# Patient Record
Sex: FEMALE | Race: WHITE | Employment: STUDENT | URBAN - METROPOLITAN AREA
[De-identification: names, ages, dates, MRNs, and addresses within clinical notes are randomized per-mention and may not be internally consistent; named-entity substitution may affect disease eponyms.]

---
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
## 2018-04-11 PROBLEM — Z00.00 ANNUAL PHYSICAL EXAM: Status: RESOLVED | Noted: 2017-12-18 | Resolved: 2018-04-11

## 2019-01-13 PROBLEM — Z00.00 ANNUAL PHYSICAL EXAM: Status: RESOLVED | Noted: 2017-12-18 | Resolved: 2019-01-13

## 2019-05-07 PROBLEM — M25.562 ACUTE PAIN OF LEFT KNEE: Status: ACTIVE | Noted: 2019-05-07

## 2021-04-29 ENCOUNTER — HOSPITAL ENCOUNTER (OUTPATIENT)
Dept: MRI IMAGING | Age: 21
Discharge: HOME | End: 2021-05-01
Payer: COMMERCIAL

## 2021-04-29 DIAGNOSIS — H47.291 OTHER OPTIC ATROPHY, RIGHT EYE: ICD-10-CM

## 2021-04-29 DIAGNOSIS — H47.031: ICD-10-CM

## 2021-04-29 LAB
BUN BLDV-MCNC: 16 MG/DL (ref 6–20)
CREAT SERPL-MCNC: 0.72 MG/DL (ref 0.5–0.9)
GFR AFRICAN AMERICAN: >60 ML/MIN
GFR NON-AFRICAN AMERICAN: >60 ML/MIN
GFR SERPL CREATININE-BSD FRML MDRD: NORMAL ML/MIN/{1.73_M2}
GFR SERPL CREATININE-BSD FRML MDRD: NORMAL ML/MIN/{1.73_M2}

## 2021-04-29 PROCEDURE — 84520 ASSAY OF UREA NITROGEN: CPT

## 2021-04-29 PROCEDURE — A9579 GAD-BASE MR CONTRAST NOS,1ML: HCPCS | Performed by: OPHTHALMOLOGY

## 2021-04-29 PROCEDURE — 6360000004 HC RX CONTRAST MEDICATION: Performed by: OPHTHALMOLOGY

## 2021-04-29 PROCEDURE — 2580000003 HC RX 258: Performed by: OPHTHALMOLOGY

## 2021-04-29 PROCEDURE — 82565 ASSAY OF CREATININE: CPT

## 2021-04-29 PROCEDURE — 70553 MRI BRAIN STEM W/O & W/DYE: CPT

## 2021-04-29 PROCEDURE — 36415 COLL VENOUS BLD VENIPUNCTURE: CPT

## 2021-04-29 RX ORDER — SODIUM CHLORIDE 0.9 % (FLUSH) 0.9 %
10 SYRINGE (ML) INJECTION PRN
Status: DISCONTINUED | OUTPATIENT
Start: 2021-04-29 | End: 2021-05-02 | Stop reason: HOSPADM

## 2021-04-29 RX ADMIN — Medication 10 ML: at 08:23

## 2021-04-29 RX ADMIN — GADOTERIDOL 10 ML: 279.3 INJECTION, SOLUTION INTRAVENOUS at 08:23

## 2024-08-12 ENCOUNTER — HOSPITAL ENCOUNTER (OUTPATIENT)
Dept: GENERAL RADIOLOGY | Age: 24
Discharge: HOME | End: 2024-08-14
Attending: PREVENTIVE MEDICINE
Payer: COMMERCIAL

## 2024-08-12 ENCOUNTER — HOSPITAL ENCOUNTER (OUTPATIENT)
Age: 24
Discharge: HOME | End: 2024-08-12

## 2024-08-12 DIAGNOSIS — T14.90XA INJURY: ICD-10-CM

## 2024-08-12 PROCEDURE — 73610 X-RAY EXAM OF ANKLE: CPT

## 2024-08-12 PROCEDURE — 73630 X-RAY EXAM OF FOOT: CPT

## 2024-08-29 ENCOUNTER — HOSPITAL ENCOUNTER (OUTPATIENT)
Dept: PHYSICAL THERAPY | Age: 24
Setting detail: THERAPIES SERIES
Discharge: HOME | End: 2024-08-29
Payer: COMMERCIAL

## 2024-08-29 PROCEDURE — 97110 THERAPEUTIC EXERCISES: CPT

## 2024-08-29 PROCEDURE — 97161 PT EVAL LOW COMPLEX 20 MIN: CPT

## 2024-08-29 PROCEDURE — 97016 VASOPNEUMATIC DEVICE THERAPY: CPT

## 2024-08-29 NOTE — THERAPY EVALUATION
[x] [facility]   Outpatient Rehabilitation & Therapy  [address]  P: [phone]   F: [phone]     Physical therapy LE evaluation     Date:  2024  Patient: Charo Mobley  : 2000  MRN: [id number]  Physician:  Nestor Rivera MD (SEND ALL NOTES)   Insurance:  - [employer]-- presumptive auth 2-3 weeks for 12 visits   Medical Diagnosis:   S93.601A (ICD-10-CM) - Unspecified sprain of right foot, initial encounter   S93.401A (ICD-10-CM) - Sprain of unspecified ligament of right ankle, initial encounter   Rehab Codes: R25 pain , M25.60 stiffness, R53.1 weakness   Onset Date: 24   Next 's appt: 24      PRECAUTIONS: None    Subjective:     Patient presents ambulating with B axillary crutches with ACE wrap and surgical shoe on R ankle and foot. She reports she was hurt at work, Aug 12 and has not been working since. She reports that she took a weird step on an incline and her R foot went into a hole and she inverted her ankle. Upon injury she didn't note any noises but felt pain and a lot of stretching to the lateral ankle immediately. She noticed edema immediately upon injury, bruising was delayed onset by 1-2 days on lateral foot and dorsum. She notes that she ices and elevates the affected ankle to relieve symptoms. She notes also that she wakes up multiple times per night due to her leg shifting out of a comfortable position, but she is able to return to sleep shortly after. She reports that she has difficulty standing for more than 5-10 minutes, is no longer driving or working. She is capable of ambulating and ascending/descending stairs with the crutches when needed. She will follow up with occupational health on 24. No weight bearing restrictions have been assigned. She feels that since the onset of the injury pain and edema are improving. Patient's goals for therapy are to return to full function and work.       PMHx: [] Unremarkable []  difficulty with bearing more than 20% of weight on R side. Due to her deficits she is unable to walk which is necessary for her work duties. Feel that with skilled PT patient will benefit in order to improve pain, rom, strength, and ability to weightbear to restore normal function.         Problems:    [x] ? Pain:  [x] ? ROM:  [x] ? Strength:  [x] ? Function:  [] Other:    STG: (to be met in 6 treatments)  Pt will self report worst pain no greater than 4/10 in R ankle in order to better tolerate ADLs/work activities with minimal dysfunction  Pt will improve AROM in R ankle DF to neutral in order to demonstrate ability to move/reach in all planes unrestricted at PLOF  Pt will improve AROM in R ankle iv/ev to at least 20 degrees in order to demonstrate ability to move/reach in all planes unrestricted at PLOF  Pt will have no palpable signs of edema in R ankle in order to demonstrate healing and return to function.  Pt will be able to stand with equal weightbearing between legs in order to progress with normalized function.  LTG: (to be met in 12 treatments)  Pt will demonstrate improved R ankle strength to 5/5 with no pain in order to demonstrate improved stability/strength necessary for unrestricted ADLs/work activities  Pt will be able to complete a full squat with no ankle pain to be able to pick things up at work.  Pt will be able to walk with no deviations in order to carry out work responsibilities.  Pt will demonstrate >10 R SLS stability with out UE support to show R ankle control needed to navigate uneven surfaces and perform higher level mobility.  Pt will increase score on FAAM to > 54/84 in order to demonstrate improved functional tolerances at PLOF with minimal restriction/dysfunction  Pt will demonstrate independence with a long term HEP for continued progress/maintenance after completion of PT                   Functional Assessment Used: FAAM   Current Status Score: 34/84   Goal Status Score: 54/84

## 2024-09-03 ENCOUNTER — HOSPITAL ENCOUNTER (OUTPATIENT)
Dept: PHYSICAL THERAPY | Age: 24
Setting detail: THERAPIES SERIES
Discharge: HOME | End: 2024-09-03
Payer: COMMERCIAL

## 2024-09-03 PROCEDURE — 97110 THERAPEUTIC EXERCISES: CPT

## 2024-09-03 PROCEDURE — 97016 VASOPNEUMATIC DEVICE THERAPY: CPT

## 2024-09-03 NOTE — FLOWSHEET NOTE
[facility]   Outpatient Rehabilitation & Therapy  [address]  P: [phone]   F: [phone]    Physical Therapy Daily Treatment Note      Date:  9/3/2024  Patient Name:  Charo Mobley    :  2000  MRN: [id number]  Physician:      Nestor Rivera MD (SEND ALL NOTES)                            Insurance:  - [employer]-- presumptive auth 2-3 weeks for 12 visits   Medical Diagnosis:   S93.601A (ICD-10-CM) - Unspecified sprain of right foot, initial encounter   S93.401A (ICD-10-CM) - Sprain of unspecified ligament of right ankle, initial encounter   Rehab Codes: R25 pain , M25.60 stiffness, R53.1 weakness   Onset Date: 24               Next 's appt: 24  Visit# / total visits: 2  Cancels/No Shows: 0/0    Subjective:  Reports today with minimal pain. Reports compliance with HEP. States she gets a tingling sensation in lateral plantar aspect of her right foot with any weightbearing through the R LE. She is still using crutches at home as she is afraid to put a lot of weight on R foot due to extreme pain she had the first week after injury.  Pain:  [] Yes  [] No Location: Right ankle Pain Rating: (0-10 scale) 2-3/10  Pain altered Tx:  [] No  [] Yes  Action:  Comments:    Objective:  Precautions: NONE   INTERVENTIONS  Reps/ Time Weight/ Level Completed  Today Comments               MODALITIES            Vasocompression - R ankle  10 min  Low, 34 deg  x                 MANUAL                                EXERCISES            PROM - R ankle   5 min   x Supine PF, Df,Inv/ER     AROM -R ankle  2x10    x   supine DF/PF     Right gastroc/soleus  stretch with strap 3x30\"  each     x   ling sitting HEP review     Arch lifts   2x10   X   Seated    Toe Yoga  2x10  X  Seated   Seated PF  2x10  X  Seated    Towel scrunches  2x10  X  Seated    Inv/ER windshield wiper on towel 2x10  x Seated    Standing R LE weight shifts M/L , forward with step 2x10 each   X

## 2024-09-05 ENCOUNTER — HOSPITAL ENCOUNTER (OUTPATIENT)
Dept: PHYSICAL THERAPY | Age: 24
Setting detail: THERAPIES SERIES
Discharge: HOME | End: 2024-09-05
Payer: COMMERCIAL

## 2024-09-05 PROCEDURE — 97116 GAIT TRAINING THERAPY: CPT

## 2024-09-05 PROCEDURE — 97016 VASOPNEUMATIC DEVICE THERAPY: CPT

## 2024-09-05 PROCEDURE — 97110 THERAPEUTIC EXERCISES: CPT

## 2024-09-05 NOTE — FLOWSHEET NOTE
[facility]   Outpatient Rehabilitation & Therapy  [address]  P: [phone]   F: [phone]    Physical Therapy Daily Treatment Note      Date:  2024  Patient Name:  Charo Mobley    :  2000  MRN: [id number]  Physician:      Nestor Rivera MD (SEND ALL NOTES)                            Insurance:  - [employer]-- presumptive auth 2-3 weeks for 12 visits   Medical Diagnosis:   S93.601A (ICD-10-CM) - Unspecified sprain of right foot, initial encounter   S93.401A (ICD-10-CM) - Sprain of unspecified ligament of right ankle, initial encounter   Rehab Codes: R25 pain , M25.60 stiffness, R53.1 weakness   Onset Date: 24               Next 's appt: 24  Visit# / total visits: 3  Cancels/No Shows: 0/0    Subjective:  Reports today with minimal pain. Patient informed by physician to decrease to single crutch.  Presented to therapy with ANTONI crutches and NWBing.   Pain:  [] Yes  [] No Location: Right ankle Pain Rating: (0-10 scale) 2/10  Pain altered Tx:  [] No  [] Yes  Action:  Comments:    Objective:  Precautions: NONE   INTERVENTIONS  Reps/ Time Weight/ Level Completed  Today Comments               MODALITIES            Vasocompression - R ankle  10 min  Low, 34 deg  x                 MANUAL                                EXERCISES            PROM - R ankle   5 min   x Supine PF, Df,Inv/ER    AROM -R ankle  2x10   X  supine DF/PF    AROM- R ankle 2x10  x  Supine INV/EV    Right gastroc/soleus  stretch with strap 3x30\"  each    X   ling sitting HEP review     Arch lifts   2x10   X   Seated    Toe Yoga  2x10  X  Seated   Seated PF  2x10  X  Seated    Towel scrunches  2x10  X  Seated    Inv/ER windshield wiper on towel 2x10  x Seated    Standing R LE weight shifts M/L , forward with step 2x10 each   X     Alternating March 2x10   X    Gait Training   X Single crutch step thru pattern      Patient Education/Home program:   : Access Code: [email or web address] //

## 2024-09-06 ENCOUNTER — HOSPITAL ENCOUNTER (OUTPATIENT)
Dept: PHYSICAL THERAPY | Age: 24
Setting detail: THERAPIES SERIES
Discharge: HOME | End: 2024-09-06
Payer: COMMERCIAL

## 2024-09-06 PROCEDURE — 97112 NEUROMUSCULAR REEDUCATION: CPT

## 2024-09-06 PROCEDURE — 97016 VASOPNEUMATIC DEVICE THERAPY: CPT

## 2024-09-06 PROCEDURE — 97110 THERAPEUTIC EXERCISES: CPT

## 2024-09-06 NOTE — FLOWSHEET NOTE
functional range. Patient education on respecting a 1-2 point increase in pain was given. Seated use of the mobo board and foot exercises were provided to strengthen the intrinsic foot musculature and promote stability. Standing exercises were given to provide loading weight acceptance and facilitate motor control. Patient reported improvement of weight bearing tolerance as compared to previous sessions. Patient was educated that she can be doing these weight shifts at home to further increase weight bearing tolerance. Vasocompression was administered to the R ankle for 10 minutes to control edema and pain following session. Patient reported an increase in pain to 3/10 following session however reported that after vasocompression it returned to 2/10. Feel that patient should continue with strengthening exercises in sitting and be progressed with standing as tolerated.    [] No change.     [] Other:   [x] Patient would continue to benefit from skilled physical therapy services in order to address the above limitations to improve functional mobility and decrease pain for patient to return to work with least amount of compensation or restriction.     STG: (to be met in 6 treatments)  Pt will self report worst pain no greater than 4/10 in R ankle in order to better tolerate ADLs/work activities with minimal dysfunction  Pt will improve AROM in R ankle DF to neutral in order to demonstrate ability to move/reach in all planes unrestricted at PLOF  Pt will improve AROM in R ankle iv/ev to at least 20 degrees in order to demonstrate ability to move/reach in all planes unrestricted at PLOF  Pt will have no palpable signs of edema in R ankle in order to demonstrate healing and return to function.  Pt will be able to stand with equal weightbearing between legs in order to progress with normalized function.  LTG: (to be met in 12 treatments)  Pt will demonstrate improved R ankle strength to 5/5 with no pain in order to

## 2024-09-09 ENCOUNTER — HOSPITAL ENCOUNTER (OUTPATIENT)
Dept: PHYSICAL THERAPY | Age: 24
Setting detail: THERAPIES SERIES
Discharge: HOME | End: 2024-09-09
Payer: COMMERCIAL

## 2024-09-09 PROCEDURE — 97016 VASOPNEUMATIC DEVICE THERAPY: CPT

## 2024-09-09 PROCEDURE — 97112 NEUROMUSCULAR REEDUCATION: CPT

## 2024-09-09 PROCEDURE — 97110 THERAPEUTIC EXERCISES: CPT

## 2024-09-11 ENCOUNTER — HOSPITAL ENCOUNTER (OUTPATIENT)
Dept: PHYSICAL THERAPY | Age: 24
Setting detail: THERAPIES SERIES
Discharge: HOME | End: 2024-09-11
Payer: COMMERCIAL

## 2024-09-11 PROCEDURE — 97112 NEUROMUSCULAR REEDUCATION: CPT

## 2024-09-11 PROCEDURE — 97110 THERAPEUTIC EXERCISES: CPT

## 2024-09-11 PROCEDURE — 97016 VASOPNEUMATIC DEVICE THERAPY: CPT

## 2024-09-13 ENCOUNTER — HOSPITAL ENCOUNTER (OUTPATIENT)
Dept: PHYSICAL THERAPY | Age: 24
Setting detail: THERAPIES SERIES
Discharge: HOME | End: 2024-09-13
Payer: COMMERCIAL

## 2024-09-13 PROCEDURE — 97016 VASOPNEUMATIC DEVICE THERAPY: CPT

## 2024-09-13 PROCEDURE — 97112 NEUROMUSCULAR REEDUCATION: CPT

## 2024-09-13 PROCEDURE — 97110 THERAPEUTIC EXERCISES: CPT

## 2024-09-16 ENCOUNTER — HOSPITAL ENCOUNTER (OUTPATIENT)
Dept: PHYSICAL THERAPY | Age: 24
Setting detail: THERAPIES SERIES
Discharge: HOME | End: 2024-09-16
Payer: COMMERCIAL

## 2024-09-16 PROCEDURE — 97112 NEUROMUSCULAR REEDUCATION: CPT

## 2024-09-16 PROCEDURE — 97110 THERAPEUTIC EXERCISES: CPT

## 2024-09-16 PROCEDURE — 97016 VASOPNEUMATIC DEVICE THERAPY: CPT

## 2024-09-18 ENCOUNTER — HOSPITAL ENCOUNTER (OUTPATIENT)
Dept: PHYSICAL THERAPY | Age: 24
Setting detail: THERAPIES SERIES
Discharge: HOME | End: 2024-09-18
Payer: COMMERCIAL

## 2024-09-18 PROCEDURE — 97016 VASOPNEUMATIC DEVICE THERAPY: CPT

## 2024-09-18 PROCEDURE — 97112 NEUROMUSCULAR REEDUCATION: CPT

## 2024-09-20 ENCOUNTER — HOSPITAL ENCOUNTER (OUTPATIENT)
Dept: PHYSICAL THERAPY | Age: 24
Setting detail: THERAPIES SERIES
Discharge: HOME | End: 2024-09-20
Payer: COMMERCIAL

## 2024-09-20 PROCEDURE — 97016 VASOPNEUMATIC DEVICE THERAPY: CPT

## 2024-09-20 PROCEDURE — 97112 NEUROMUSCULAR REEDUCATION: CPT

## 2024-09-23 ENCOUNTER — HOSPITAL ENCOUNTER (OUTPATIENT)
Dept: PHYSICAL THERAPY | Age: 24
Setting detail: THERAPIES SERIES
Discharge: HOME | End: 2024-09-23
Payer: COMMERCIAL

## 2024-09-23 PROCEDURE — 97116 GAIT TRAINING THERAPY: CPT

## 2024-09-23 PROCEDURE — 97016 VASOPNEUMATIC DEVICE THERAPY: CPT

## 2024-09-23 PROCEDURE — 97530 THERAPEUTIC ACTIVITIES: CPT

## 2024-09-25 ENCOUNTER — HOSPITAL ENCOUNTER (OUTPATIENT)
Dept: PHYSICAL THERAPY | Age: 24
Setting detail: THERAPIES SERIES
Discharge: HOME | End: 2024-09-25
Payer: COMMERCIAL

## 2024-09-25 PROCEDURE — 97112 NEUROMUSCULAR REEDUCATION: CPT

## 2024-09-25 PROCEDURE — 97016 VASOPNEUMATIC DEVICE THERAPY: CPT

## 2024-09-25 PROCEDURE — 97116 GAIT TRAINING THERAPY: CPT

## 2024-10-01 ENCOUNTER — HOSPITAL ENCOUNTER (OUTPATIENT)
Dept: PHYSICAL THERAPY | Age: 24
Setting detail: THERAPIES SERIES
Discharge: HOME | End: 2024-10-01
Payer: COMMERCIAL

## 2024-10-01 PROCEDURE — 97112 NEUROMUSCULAR REEDUCATION: CPT

## 2024-10-01 PROCEDURE — 97016 VASOPNEUMATIC DEVICE THERAPY: CPT

## 2024-10-01 NOTE — FLOWSHEET NOTE
[facility]   Outpatient Rehabilitation & Therapy  [address]  P: [phone]   F: [phone]    Physical Therapy Daily Treatment Note    Date:  10/1/2024  Patient Name:  Charo Mobley    :  2000  MRN: [id number]  Physician: Nestor Rivera MD (SEND ALL NOTES)                            Insurance: [facility] [employer]-- presumptive auth 2-3 weeks for 12 visits -- END DATE  - Notes need sent to [facility] [phone] (Janelle). Approval Valid 10/1 - 10/18 2-3x wk for 6 Visits   Medical Diagnosis:   S93.601A (ICD-10-CM) - Unspecified sprain of right foot, initial encounter   S93.401A (ICD-10-CM) - Sprain of unspecified ligament of right ankle, initial encounter   Rehab Codes: R25 pain , M25.60 stiffness, R53.1 weakness   Onset Date: 24               Next 's appt: 10/07/24 with Occupational Health   Visit# / total visits:  ( in current C9)  Cancels/No Shows: 0/0    Subjective:    Pt presents with lace up ankle brace donned. Pt arrives to treatment with no active pain. Pt has returned to work on light to duty yesterday (). Pt reported no issues with return to work on light duty and iced after getting home. Pt notes imporventment in morning stiffness in R ankle and dorsal foot the past couple of days. Pt with no complaints from previous treatment.     Pain:  [] Yes  [x] No Location: R calf and Posterior knee         Pain Rating: (0-10 scale) denies/10   Pain altered Tx:  [x] No  [] Yes  Action:  Comments:    Objective:  Precautions: per Dr. Rivera - no jumping   INTERVENTIONS  Reps/ Time Weight/ Level Completed  Today Comments               MODALITIES            Vasocompression - R ankle  10 min  Low, 34 deg  x                 MANUAL                                EXERCISES            Eccentric heel raises 10x2  x Up with 2 down with 1          Balance       Standing Gastroc stretch  3x 30s Slant board x    Alternating March 2 x10 Foam   No UE

## 2024-10-02 NOTE — FLOWSHEET NOTE
sets - 10 reps - 3\" hold  - Seated Lesser Toes Extension  - 3 x daily - 7 x weekly - 2 sets - 10 reps  - Seated Great Toe Extension  - 3 x daily - 7 x weekly - 2 sets - 10 reps  - Toe Spreading  - 3 x daily - 7 x weekly - 2 sets - 10 reps - 3\" hold  - Towel Scrunches  - 3 x daily - 7 x weekly - 2 sets - 10 reps    Specific Instructions for next treatment:  - Dynamic balance on uneven surfaces- UE + LE tasks  -Walking on uneven surface/inclines   -Consider outside ambulation   -Update HEP   -Vaso as needed for remaining edema     Assessment: [x] Progressing toward goals. Treatment performed without use of R ankle brace. Continued with higher level WB exercises on on unstable surfaces to challenge proprioception and facilitate motor control of the Rankle with the added benefit of strengthening to simulate return to work activities. Patient was assessed frequently to ensure no increase in pain with treatment. Patient was then challenged with various dynamic ambulation activities outside in the grass to further simulate work duties and further challenge dynamic balance with the added benefit of strengthening and facilitating motor control. Patient tolerated these intervention and denied any increase in pain. Treatment ended with vaso-compression for pain management. Pt reported a decrease to 2/10 pain following session.    [] No change.     [] Other:   [x] Patient would continue to benefit from skilled physical therapy services in order to address the above limitations to improve functional mobility and decrease pain for patient to return to work with least amount of compensation or restriction.     GOALS     STG: (to be met in 6 treatments)  Pt will self report worst pain no greater than 4/10 in R ankle in order to better tolerate ADLs/work activities with minimal dysfunction  9/16: Progressing- 4/10 the other night but has not happened since- usually averages a 2/10- will monitor any future incidence.  9/23: MET- 3/10

## 2024-10-03 ENCOUNTER — HOSPITAL ENCOUNTER (OUTPATIENT)
Dept: PHYSICAL THERAPY | Age: 24
Setting detail: THERAPIES SERIES
Discharge: HOME | End: 2024-10-03
Payer: COMMERCIAL

## 2024-10-03 PROCEDURE — 97116 GAIT TRAINING THERAPY: CPT

## 2024-10-03 PROCEDURE — 97016 VASOPNEUMATIC DEVICE THERAPY: CPT

## 2024-10-03 PROCEDURE — 97112 NEUROMUSCULAR REEDUCATION: CPT

## 2024-10-08 ENCOUNTER — HOSPITAL ENCOUNTER (OUTPATIENT)
Dept: PHYSICAL THERAPY | Age: 24
Setting detail: THERAPIES SERIES
Discharge: HOME | End: 2024-10-08
Payer: COMMERCIAL

## 2024-10-08 PROCEDURE — 97016 VASOPNEUMATIC DEVICE THERAPY: CPT

## 2024-10-08 PROCEDURE — 97112 NEUROMUSCULAR REEDUCATION: CPT

## 2024-10-08 PROCEDURE — 97116 GAIT TRAINING THERAPY: CPT

## 2024-10-08 NOTE — FLOWSHEET NOTE
[facility]   Outpatient Rehabilitation & Therapy  [address]  P: [phone]   F: [phone]    Physical Therapy Daily Treatment Note    Date:  10/8/2024  Patient Name:  Charo Mobley    :  2000  MRN: [id number]  Physician: Nestor Rivera MD (SEND ALL NOTES)                            Insurance: [facility] [employer]-- presumptive auth 2-3 weeks for 12 visits -- END DATE  - Notes need sent to [facility] [phone] (Janelle). Approval Valid 10/1 - 10/18 2-3x wk for 6 Visits   Medical Diagnosis:   S93.601A (ICD-10-CM) - Unspecified sprain of right foot, initial encounter   S93.401A (ICD-10-CM) - Sprain of unspecified ligament of right ankle, initial encounter   Rehab Codes: R25 pain , M25.60 stiffness, R53.1 weakness   Onset Date: 24               Next 's appt: 10/07/24 with Occupational Health   Visit# / total visits: 15/18 (3/6 in current C9)  Cancels/No Shows: 0/0    Subjective:    Pt presents with lace up ankle brace donned. Pt denies pain in ankle at the beginning of treatment. Pt had appointment with occupation health yesterday and is cleared from normal duty after the completion of physical therapy.     Pain:  [] Yes  [x] No Location: Medial and Lateral R ankle       Pain Rating: (0-10 scale) denies/10   Pain altered Tx:  [x] No  [] Yes  Action:  Comments:    Objective:  Precautions: per Dr. Rivera - no jumping   INTERVENTIONS  Reps/ Time Weight/ Level Completed  Today Comments               MODALITIES            Vasocompression - R ankle  10 min  Low, 34 deg  x                 MANUAL                                EXERCISES            Eccentric heel raises 10x2  x Up with 2 down with 1          Balance       Standing Gastroc stretch (B) 3x 30s Slant board x    Alternating March 2 x10 Foam   No UE support   Added foam      Tandem stance EC 30\"x 2 Foam   Added EC      SLS ABC on foam 1x 30s foam     SLS foam Ball pass 1x 1 min  Foam- 6lb

## 2024-10-10 ENCOUNTER — HOSPITAL ENCOUNTER (OUTPATIENT)
Dept: PHYSICAL THERAPY | Age: 24
Setting detail: THERAPIES SERIES
Discharge: HOME | End: 2024-10-10
Payer: COMMERCIAL

## 2024-10-10 PROCEDURE — 97016 VASOPNEUMATIC DEVICE THERAPY: CPT

## 2024-10-10 PROCEDURE — 97116 GAIT TRAINING THERAPY: CPT

## 2024-10-10 PROCEDURE — 97112 NEUROMUSCULAR REEDUCATION: CPT

## 2024-10-10 NOTE — FLOWSHEET NOTE
Other      Total Billable time 45 3    Time on untimed codes 10 1      Time In: 10:45       Time Out: 11:40    Electronically signed by:  Kayden Li, SPT

## 2024-10-15 ENCOUNTER — HOSPITAL ENCOUNTER (OUTPATIENT)
Dept: PHYSICAL THERAPY | Age: 24
Setting detail: THERAPIES SERIES
Discharge: HOME | End: 2024-10-15
Payer: COMMERCIAL

## 2024-10-15 PROCEDURE — 97110 THERAPEUTIC EXERCISES: CPT

## 2024-10-15 PROCEDURE — 97112 NEUROMUSCULAR REEDUCATION: CPT

## 2024-10-15 NOTE — PROGRESS NOTES
[facility]   Outpatient Rehabilitation & Therapy  [address]  P: [phone]   F: [phone]    Physical Therapy Daily Treatment Note/Progress Note     Date:  10/15/2024  Patient Name:  Charo Mobley    :  2000  MRN: [id number]  Physician: Nestor Rivera MD (SEND ALL NOTES)                            Insurance: [facility] [employer]-- presumptive auth 2-3 weeks for 12 visits -- END DATE  - Notes need sent to [facility] [phone] (Janelle). Approval Valid 10/1 - 10/18 2-3x wk for 6 Visits   Medical Diagnosis:   S93.601A (ICD-10-CM) - Unspecified sprain of right foot, initial encounter   S93.401A (ICD-10-CM) - Sprain of unspecified ligament of right ankle, initial encounter   Rehab Codes: R25 pain , M25.60 stiffness, R53.1 weakness   Onset Date: 24               Next 's appt: 10/07/24 with Occupational Health   Visit# / total visits:  (5/6 in current C9)  Cancels/No Shows: 0/0  Date of initial visit: 24                  Date of PN: 24; 10/15/24   Formal progress note reporting period: 24 - 10/15/24       Subjective:     Pt notes she was sore over the weekend. She thinks the weather changing had some affect. She has been working and noting she is doing well. She does still gets discomfort in the lateral ankle and dorsum of foot with driving > 1 hours but this dissipates quickly.     Pain:  [] Yes  [x] No Location: Denies        Pain Rating: (0-10 scale) denies/10   Pain altered Tx:  [x] No  [] Yes  Action:  Comments:    Objective:  Precautions: per Dr. Rivera - no jumping   INTERVENTIONS  Reps/ Time Weight/ Level Completed  Today Comments               MODALITIES            Vasocompression - R ankle  10 min  Low, 34 deg                   MANUAL                                EXERCISES            Single leg heel raise- R  10x2  x Toes on 2in step for greater ROM           Balance       Standing Gastroc stretch (B) 3x 30s Slant board

## 2024-10-17 ENCOUNTER — HOSPITAL ENCOUNTER (OUTPATIENT)
Dept: PHYSICAL THERAPY | Age: 24
Setting detail: THERAPIES SERIES
Discharge: HOME | End: 2024-10-17
Payer: COMMERCIAL

## 2024-10-17 PROCEDURE — 97112 NEUROMUSCULAR REEDUCATION: CPT

## 2024-10-17 PROCEDURE — 97110 THERAPEUTIC EXERCISES: CPT

## 2024-10-17 NOTE — FLOWSHEET NOTE
[facility]   Outpatient Rehabilitation & Therapy  [address]  P: [phone]   F: [phone]    Physical Therapy Daily Treatment Note    Date:  10/17/2024  Patient Name:  Charo Mobley    :  2000  MRN: [id number]  Physician: Nestor Rivera MD (SEND ALL NOTES)                            Insurance: [facility] [employer]-- presumptive auth 2-3 weeks for 12 visits -- END DATE  - [facility] to [facility] [phone] (Janelle). Approval Valid 10/1 - 10/18 2-3x wk for 6 Visits   Medical Diagnosis:   S93.601A (ICD-10-CM) - Unspecified sprain of right foot, initial encounter   S93.401A (ICD-10-CM) - Sprain of unspecified ligament of right ankle, initial encounter   Rehab Codes: R25 pain , M25.60 stiffness, R53.1 weakness   Onset Date: 24               Next 's appt: 10/07/24 with Occupational Health   Visit# / total visits:  (/ in current C9)  Cancels/No Shows: 0/0    Subjective:     Pt denies any pain in ankle and reports no changes since previous visit. Pt has had no problems with work and has been completing increased activities outside.     Pain:  [] Yes  [x] No Location: Denies        Pain Rating: (0-10 scale) denies/10   Pain altered Tx:  [x] No  [] Yes  Action:  Comments:    Objective:  Precautions: per Dr. Rivera - no jumping   INTERVENTIONS  Reps/ Time Weight/ Level Completed  Today Comments               MODALITIES            Vasocompression - R ankle  10 min  Low, 34 deg                   MANUAL                               EXERCISES           Single leg heel raise- R  10x2   Toes on 2in step for greater ROM           Balance       Standing Gastroc stretch (B) 3x 30s Slant board X    Alternating March 20x Bosu  x No UE support   Hold for 1-2s      Tandem stance EC 30\"x 2 Foam      SLS ABC on foam 1x 30s foam x    SLS foam Ball pass 2x 15x Foam- 9#     SLS Kettle ball pass-arounds 15x ea way  Foam  15#  R stance    Step up into R SLS: Fwd

## 2025-02-18 ENCOUNTER — HOSPITAL ENCOUNTER (OUTPATIENT)
Age: 25
Setting detail: SPECIMEN
Discharge: HOME | End: 2025-02-18

## 2025-02-18 DIAGNOSIS — Z83.49 FAMILY HISTORY OF THYROID DISEASE IN FATHER: ICD-10-CM

## 2025-02-18 PROBLEM — F43.10 PTSD (POST-TRAUMATIC STRESS DISORDER): Status: ACTIVE | Noted: 2022-09-19

## 2025-02-18 LAB
T4 FREE SERPL-MCNC: 0.8 NG/DL (ref 0.92–1.68)
TSH SERPL DL<=0.05 MIU/L-ACNC: 1.41 UIU/ML (ref 0.27–4.2)

## 2025-02-18 NOTE — RESULT ENCOUNTER NOTE
Smiley - your thyroid is slightly low; I recommend you start levothyroxine 25 mcg daily. To what pharmacy can I send that?    Please come to the lab in one month to recheck the thyroid labs. You may do that without an appointment and without an order at the lab you used today.

## 2025-07-02 ENCOUNTER — TRANSCRIBE ORDERS (OUTPATIENT)
Dept: ADMINISTRATIVE | Age: 25
End: 2025-07-02

## 2025-07-02 DIAGNOSIS — E03.8 PITUITARY HYPOTHYROIDISM: Primary | ICD-10-CM

## 2025-07-21 ENCOUNTER — HOSPITAL ENCOUNTER (OUTPATIENT)
Dept: MRI IMAGING | Age: 25
Discharge: HOME | End: 2025-07-23
Attending: OPHTHALMOLOGY
Payer: MEDICAID

## 2025-07-21 DIAGNOSIS — E03.8 PITUITARY HYPOTHYROIDISM: ICD-10-CM

## 2025-07-21 PROCEDURE — 6360000004 HC RX CONTRAST MEDICATION: Performed by: OPHTHALMOLOGY

## 2025-07-21 PROCEDURE — 70553 MRI BRAIN STEM W/O & W/DYE: CPT

## 2025-07-21 PROCEDURE — A9576 INJ PROHANCE MULTIPACK: HCPCS | Performed by: OPHTHALMOLOGY

## 2025-07-21 RX ORDER — GADOTERIDOL 279.3 MG/ML
12 INJECTION INTRAVENOUS
Status: COMPLETED | OUTPATIENT
Start: 2025-07-21 | End: 2025-07-21

## 2025-07-21 RX ADMIN — GADOTERIDOL 12 ML: 279.3 INJECTION, SOLUTION INTRAVENOUS at 15:55
